# Patient Record
Sex: FEMALE | Race: WHITE | URBAN - METROPOLITAN AREA
[De-identification: names, ages, dates, MRNs, and addresses within clinical notes are randomized per-mention and may not be internally consistent; named-entity substitution may affect disease eponyms.]

---

## 2017-09-16 ENCOUNTER — HOSPITAL ENCOUNTER (OUTPATIENT)
Facility: CLINIC | Age: 62
Setting detail: OBSERVATION
Discharge: HOME | End: 2017-09-17
Attending: EMERGENCY MEDICINE | Admitting: INTERNAL MEDICINE
Payer: MEDICARE

## 2017-09-16 ENCOUNTER — APPOINTMENT (OUTPATIENT)
Dept: PHYSICAL THERAPY | Facility: CLINIC | Age: 62
End: 2017-09-16
Attending: INTERNAL MEDICINE
Payer: MEDICARE

## 2017-09-16 ENCOUNTER — APPOINTMENT (OUTPATIENT)
Dept: CT IMAGING | Facility: CLINIC | Age: 62
End: 2017-09-16
Attending: EMERGENCY MEDICINE
Payer: MEDICARE

## 2017-09-16 DIAGNOSIS — R56.9 SEIZURES (H): ICD-10-CM

## 2017-09-16 PROBLEM — R68.89 SPELLS OF DECREASED ATTENTIVENESS: Status: ACTIVE | Noted: 2017-09-16

## 2017-09-16 LAB
ALBUMIN SERPL-MCNC: 3.6 G/DL (ref 3.4–5)
ALP SERPL-CCNC: 66 U/L (ref 40–150)
ALT SERPL W P-5'-P-CCNC: 35 U/L (ref 0–50)
ANION GAP SERPL CALCULATED.3IONS-SCNC: 9 MMOL/L (ref 3–14)
AST SERPL W P-5'-P-CCNC: 37 U/L (ref 0–45)
BILIRUB SERPL-MCNC: 0.3 MG/DL (ref 0.2–1.3)
BUN SERPL-MCNC: 16 MG/DL (ref 7–30)
CALCIUM SERPL-MCNC: 9.2 MG/DL (ref 8.5–10.1)
CHLORIDE SERPL-SCNC: 108 MMOL/L (ref 94–109)
CO2 SERPL-SCNC: 26 MMOL/L (ref 20–32)
CREAT SERPL-MCNC: 0.96 MG/DL (ref 0.52–1.04)
ERYTHROCYTE [DISTWIDTH] IN BLOOD BY AUTOMATED COUNT: 12.8 % (ref 10–15)
ETHANOL SERPL-MCNC: 0.13 G/DL
GFR SERPL CREATININE-BSD FRML MDRD: 59 ML/MIN/1.7M2
GLUCOSE SERPL-MCNC: 86 MG/DL (ref 70–99)
HCT VFR BLD AUTO: 38 % (ref 35–47)
HGB BLD-MCNC: 12.8 G/DL (ref 11.7–15.7)
INTERPRETATION ECG - MUSE: NORMAL
MCH RBC QN AUTO: 30.1 PG (ref 26.5–33)
MCHC RBC AUTO-ENTMCNC: 33.7 G/DL (ref 31.5–36.5)
MCV RBC AUTO: 89 FL (ref 78–100)
PLATELET # BLD AUTO: 225 10E9/L (ref 150–450)
POTASSIUM SERPL-SCNC: 3.8 MMOL/L (ref 3.4–5.3)
PROT SERPL-MCNC: 7.5 G/DL (ref 6.8–8.8)
RBC # BLD AUTO: 4.25 10E12/L (ref 3.8–5.2)
SODIUM SERPL-SCNC: 143 MMOL/L (ref 133–144)
WBC # BLD AUTO: 9.2 10E9/L (ref 4–11)

## 2017-09-16 PROCEDURE — 93005 ELECTROCARDIOGRAM TRACING: CPT

## 2017-09-16 PROCEDURE — 97530 THERAPEUTIC ACTIVITIES: CPT | Mod: GP

## 2017-09-16 PROCEDURE — 99220 ZZC INITIAL OBSERVATION CARE,LEVL III: CPT | Performed by: INTERNAL MEDICINE

## 2017-09-16 PROCEDURE — 40000061 ZZH STATISTIC EEG TIME EA 10 MIN

## 2017-09-16 PROCEDURE — A9270 NON-COVERED ITEM OR SERVICE: HCPCS | Mod: GY | Performed by: PHYSICIAN ASSISTANT

## 2017-09-16 PROCEDURE — 80053 COMPREHEN METABOLIC PANEL: CPT | Performed by: EMERGENCY MEDICINE

## 2017-09-16 PROCEDURE — 85027 COMPLETE CBC AUTOMATED: CPT | Performed by: EMERGENCY MEDICINE

## 2017-09-16 PROCEDURE — 99207 ZZC CDG-CODE CATEGORY CHANGED: CPT | Performed by: INTERNAL MEDICINE

## 2017-09-16 PROCEDURE — A9270 NON-COVERED ITEM OR SERVICE: HCPCS | Mod: GY | Performed by: INTERNAL MEDICINE

## 2017-09-16 PROCEDURE — 96375 TX/PRO/DX INJ NEW DRUG ADDON: CPT

## 2017-09-16 PROCEDURE — 96361 HYDRATE IV INFUSION ADD-ON: CPT

## 2017-09-16 PROCEDURE — 25000132 ZZH RX MED GY IP 250 OP 250 PS 637: Mod: GY | Performed by: INTERNAL MEDICINE

## 2017-09-16 PROCEDURE — 95816 EEG AWAKE AND DROWSY: CPT

## 2017-09-16 PROCEDURE — 25000132 ZZH RX MED GY IP 250 OP 250 PS 637: Mod: GY | Performed by: PHYSICIAN ASSISTANT

## 2017-09-16 PROCEDURE — 99291 CRITICAL CARE FIRST HOUR: CPT | Mod: 25

## 2017-09-16 PROCEDURE — 40000193 ZZH STATISTIC PT WARD VISIT

## 2017-09-16 PROCEDURE — 96372 THER/PROPH/DIAG INJ SC/IM: CPT

## 2017-09-16 PROCEDURE — G0378 HOSPITAL OBSERVATION PER HR: HCPCS

## 2017-09-16 PROCEDURE — 25000128 H RX IP 250 OP 636: Performed by: INTERNAL MEDICINE

## 2017-09-16 PROCEDURE — 25000128 H RX IP 250 OP 636

## 2017-09-16 PROCEDURE — 97161 PT EVAL LOW COMPLEX 20 MIN: CPT | Mod: GP

## 2017-09-16 PROCEDURE — 97116 GAIT TRAINING THERAPY: CPT | Mod: GP,59

## 2017-09-16 PROCEDURE — 80320 DRUG SCREEN QUANTALCOHOLS: CPT | Performed by: EMERGENCY MEDICINE

## 2017-09-16 PROCEDURE — 96365 THER/PROPH/DIAG IV INF INIT: CPT

## 2017-09-16 PROCEDURE — 70450 CT HEAD/BRAIN W/O DYE: CPT

## 2017-09-16 PROCEDURE — 25000128 H RX IP 250 OP 636: Performed by: EMERGENCY MEDICINE

## 2017-09-16 PROCEDURE — 99292 CRITICAL CARE ADDL 30 MIN: CPT

## 2017-09-16 RX ORDER — BISACODYL 10 MG
10 SUPPOSITORY, RECTAL RECTAL DAILY PRN
Status: DISCONTINUED | OUTPATIENT
Start: 2017-09-16 | End: 2017-09-17 | Stop reason: HOSPADM

## 2017-09-16 RX ORDER — ONDANSETRON 2 MG/ML
INJECTION INTRAMUSCULAR; INTRAVENOUS
Status: COMPLETED
Start: 2017-09-16 | End: 2017-09-16

## 2017-09-16 RX ORDER — FLUTICASONE PROPIONATE 50 MCG
1 SPRAY, SUSPENSION (ML) NASAL DAILY PRN
COMMUNITY

## 2017-09-16 RX ORDER — LORAZEPAM 2 MG/ML
2 INJECTION INTRAMUSCULAR
Status: DISCONTINUED | OUTPATIENT
Start: 2017-09-16 | End: 2017-09-17 | Stop reason: HOSPADM

## 2017-09-16 RX ORDER — CELECOXIB 100 MG/1
100 CAPSULE ORAL 2 TIMES DAILY
Status: DISCONTINUED | OUTPATIENT
Start: 2017-09-16 | End: 2017-09-17 | Stop reason: HOSPADM

## 2017-09-16 RX ORDER — ONDANSETRON 2 MG/ML
4 INJECTION INTRAMUSCULAR; INTRAVENOUS EVERY 6 HOURS PRN
Status: DISCONTINUED | OUTPATIENT
Start: 2017-09-16 | End: 2017-09-17 | Stop reason: HOSPADM

## 2017-09-16 RX ORDER — FUROSEMIDE 40 MG
40 TABLET ORAL DAILY
Status: DISCONTINUED | OUTPATIENT
Start: 2017-09-16 | End: 2017-09-17 | Stop reason: HOSPADM

## 2017-09-16 RX ORDER — ONDANSETRON 4 MG/1
4 TABLET, ORALLY DISINTEGRATING ORAL EVERY 6 HOURS PRN
Status: DISCONTINUED | OUTPATIENT
Start: 2017-09-16 | End: 2017-09-17 | Stop reason: HOSPADM

## 2017-09-16 RX ORDER — POLYETHYLENE GLYCOL 3350 17 G/17G
17 POWDER, FOR SOLUTION ORAL DAILY PRN
Status: DISCONTINUED | OUTPATIENT
Start: 2017-09-16 | End: 2017-09-17 | Stop reason: HOSPADM

## 2017-09-16 RX ORDER — LORAZEPAM 2 MG/ML
1 INJECTION INTRAMUSCULAR ONCE
Status: DISCONTINUED | OUTPATIENT
Start: 2017-09-16 | End: 2017-09-17 | Stop reason: HOSPADM

## 2017-09-16 RX ORDER — CLONAZEPAM 1 MG/1
1 TABLET ORAL AT BEDTIME
Status: DISCONTINUED | OUTPATIENT
Start: 2017-09-16 | End: 2017-09-17 | Stop reason: HOSPADM

## 2017-09-16 RX ORDER — GABAPENTIN 600 MG/1
600 TABLET ORAL 3 TIMES DAILY
Status: DISCONTINUED | OUTPATIENT
Start: 2017-09-16 | End: 2017-09-17 | Stop reason: HOSPADM

## 2017-09-16 RX ORDER — POTASSIUM CHLORIDE 1500 MG/1
20 TABLET, EXTENDED RELEASE ORAL DAILY
COMMUNITY

## 2017-09-16 RX ORDER — METOPROLOL TARTRATE 25 MG/1
25 TABLET, FILM COATED ORAL 2 TIMES DAILY
Status: DISCONTINUED | OUTPATIENT
Start: 2017-09-16 | End: 2017-09-17 | Stop reason: HOSPADM

## 2017-09-16 RX ORDER — LABETALOL HYDROCHLORIDE 5 MG/ML
10 INJECTION, SOLUTION INTRAVENOUS
Status: DISCONTINUED | OUTPATIENT
Start: 2017-09-16 | End: 2017-09-17 | Stop reason: HOSPADM

## 2017-09-16 RX ORDER — PROCHLORPERAZINE MALEATE 5 MG
5-10 TABLET ORAL EVERY 6 HOURS PRN
Status: DISCONTINUED | OUTPATIENT
Start: 2017-09-16 | End: 2017-09-17 | Stop reason: HOSPADM

## 2017-09-16 RX ORDER — PROCHLORPERAZINE 25 MG
25 SUPPOSITORY, RECTAL RECTAL EVERY 12 HOURS PRN
Status: DISCONTINUED | OUTPATIENT
Start: 2017-09-16 | End: 2017-09-17 | Stop reason: HOSPADM

## 2017-09-16 RX ORDER — LEVOTHYROXINE SODIUM 112 UG/1
112 TABLET ORAL DAILY
COMMUNITY

## 2017-09-16 RX ORDER — CLONAZEPAM 1 MG/1
1 TABLET ORAL AT BEDTIME
Status: DISCONTINUED | OUTPATIENT
Start: 2017-09-16 | End: 2017-09-16

## 2017-09-16 RX ORDER — LEVOTHYROXINE SODIUM 112 UG/1
112 TABLET ORAL DAILY
Status: DISCONTINUED | OUTPATIENT
Start: 2017-09-16 | End: 2017-09-17 | Stop reason: HOSPADM

## 2017-09-16 RX ORDER — LORAZEPAM 2 MG/ML
INJECTION INTRAMUSCULAR
Status: DISCONTINUED
Start: 2017-09-16 | End: 2017-09-16 | Stop reason: HOSPADM

## 2017-09-16 RX ORDER — SODIUM CHLORIDE AND POTASSIUM CHLORIDE 150; 450 MG/100ML; MG/100ML
INJECTION, SOLUTION INTRAVENOUS CONTINUOUS
Status: DISCONTINUED | OUTPATIENT
Start: 2017-09-16 | End: 2017-09-16

## 2017-09-16 RX ORDER — HYDROCORTISONE 5 MG/1
5 TABLET ORAL 2 TIMES DAILY
Status: DISCONTINUED | OUTPATIENT
Start: 2017-09-16 | End: 2017-09-17 | Stop reason: HOSPADM

## 2017-09-16 RX ORDER — LEFLUNOMIDE 20 MG/1
20 TABLET ORAL DAILY
Status: DISCONTINUED | OUTPATIENT
Start: 2017-09-16 | End: 2017-09-17 | Stop reason: HOSPADM

## 2017-09-16 RX ORDER — ACETAMINOPHEN 325 MG/1
650 TABLET ORAL EVERY 4 HOURS PRN
Status: DISCONTINUED | OUTPATIENT
Start: 2017-09-16 | End: 2017-09-17 | Stop reason: HOSPADM

## 2017-09-16 RX ORDER — SODIUM CHLORIDE 9 MG/ML
INJECTION, SOLUTION INTRAVENOUS ONCE
Status: COMPLETED | OUTPATIENT
Start: 2017-09-16 | End: 2017-09-16

## 2017-09-16 RX ORDER — NALOXONE HYDROCHLORIDE 0.4 MG/ML
.1-.4 INJECTION, SOLUTION INTRAMUSCULAR; INTRAVENOUS; SUBCUTANEOUS
Status: DISCONTINUED | OUTPATIENT
Start: 2017-09-16 | End: 2017-09-17 | Stop reason: HOSPADM

## 2017-09-16 RX ORDER — AMOXICILLIN 250 MG
1-2 CAPSULE ORAL 2 TIMES DAILY PRN
Status: DISCONTINUED | OUTPATIENT
Start: 2017-09-16 | End: 2017-09-17 | Stop reason: HOSPADM

## 2017-09-16 RX ADMIN — POTASSIUM CHLORIDE AND SODIUM CHLORIDE: 450; 150 INJECTION, SOLUTION INTRAVENOUS at 05:55

## 2017-09-16 RX ADMIN — LEVETIRACETAM 1500 MG: 100 INJECTION, SOLUTION INTRAVENOUS at 00:58

## 2017-09-16 RX ADMIN — LABETALOL HYDROCHLORIDE 10 MG: 5 INJECTION, SOLUTION INTRAVENOUS at 22:58

## 2017-09-16 RX ADMIN — ACETAMINOPHEN 650 MG: 325 TABLET, FILM COATED ORAL at 05:59

## 2017-09-16 RX ADMIN — SODIUM CHLORIDE: 9 INJECTION, SOLUTION INTRAVENOUS at 02:09

## 2017-09-16 RX ADMIN — SODIUM CHLORIDE 1000 ML: 9 INJECTION, SOLUTION INTRAVENOUS at 00:58

## 2017-09-16 RX ADMIN — GABAPENTIN 600 MG: 600 TABLET, FILM COATED ORAL at 16:00

## 2017-09-16 RX ADMIN — CELECOXIB 100 MG: 100 CAPSULE ORAL at 21:30

## 2017-09-16 RX ADMIN — CLONAZEPAM 1 MG: 1 TABLET ORAL at 21:30

## 2017-09-16 RX ADMIN — METOPROLOL TARTRATE 25 MG: 25 TABLET ORAL at 19:29

## 2017-09-16 RX ADMIN — FUROSEMIDE 40 MG: 40 TABLET ORAL at 19:28

## 2017-09-16 RX ADMIN — GABAPENTIN 600 MG: 600 TABLET, FILM COATED ORAL at 21:29

## 2017-09-16 RX ADMIN — LEFLUNOMIDE 20 MG: 20 TABLET, FILM COATED ORAL at 17:41

## 2017-09-16 RX ADMIN — SODIUM CHLORIDE 500 ML: 9 INJECTION, SOLUTION INTRAVENOUS at 02:09

## 2017-09-16 RX ADMIN — HYDROCORTISONE 15 MG: 10 TABLET ORAL at 19:28

## 2017-09-16 RX ADMIN — GABAPENTIN 600 MG: 600 TABLET, FILM COATED ORAL at 11:15

## 2017-09-16 RX ADMIN — ONDANSETRON 4 MG: 2 SOLUTION INTRAMUSCULAR; INTRAVENOUS at 01:36

## 2017-09-16 RX ADMIN — HYDROCORTISONE 5 MG: 5 TABLET ORAL at 21:29

## 2017-09-16 RX ADMIN — HYDROCORTISONE 5 MG: 5 TABLET ORAL at 18:18

## 2017-09-16 RX ADMIN — METFORMIN HYDROCHLORIDE 500 MG: 500 TABLET, FILM COATED ORAL at 18:18

## 2017-09-16 RX ADMIN — ACETAMINOPHEN 650 MG: 325 TABLET, FILM COATED ORAL at 19:29

## 2017-09-16 ASSESSMENT — ENCOUNTER SYMPTOMS: SEIZURES: 1

## 2017-09-16 NOTE — PROGRESS NOTES
Care Transition Initial Assessment - RN    Reason For Consult: discharge planning, nonalliance concerns, utilization management concerns (OBS Broch)   Met with: Patient.    DATA   Active Problems:    Spells of decreased attentiveness       Cognitive Status: awake, oriented.  Primary Care Clinic Name: [facility]  Primary Care MD Name: Charis Armendariz  Contact information and PCP information verified: Yes, pt had no PCP listed.  Confirmed PCP and entered into EPIC    Lives With: significant other  Living Arrangements: house     Description of Support System: Supportive, Involved   Who is your support system?: Significant Other   Support Assessment: Adequate family and caregiver support, Adequate social supports     Insurance concerns: Other Pt is obs care.    ASSESSMENT  Patient currently receives the following services:  none        Identified issues/concerns regarding health management: Pt is having difficulty using her tablet, signing in, etc. She is in the hospital for sz vs nonepileptic event.  She is in town for a conference on a health disorder that she has.  [facility] won't be [facility] home until tomorrow.    PLAN  Financial costs for the patient include obs care copays .  Patient given options and choices for discharge: Neurologist prefers monitor overnight d/t lg amounts medications and not at baseline.  Patient/family is agreeable to the plan?  Yes  Patient anticipates discharging to hot tomorrow and fly home in afternoon .  Anticipate she will be able to fly by herself tomorrow.        Patient anticipates needs for home equipment: No      Care  (CTS) will continue to follow as needed.

## 2017-09-16 NOTE — PROVIDER NOTIFICATION
Paged regarding medication request. Patient takes hydrocortisone 15mg qam, 5mg noon, and 5mg in evening. She is very concerned that she missed her am dose and is requesting that she receive this. Discussed that BP is ok but will give 15mg tonight and get her back on home regimen in the am. Also requesting metoprolol and lasix, both have been ordered. BP this evening is 143/74.     Marie Carreon PA-C

## 2017-09-16 NOTE — ED NOTES
Bed: [facility]  Expected date: 9/16/17  Expected time: 12:20 AM  Means of arrival: Ambulance  Comments:  Mouna 536 62F status seizures

## 2017-09-16 NOTE — ED NOTES
MD notified of BPs, ordered 500 ml NS bolus. Patient continues to sleep soundly without evidence of seizures.

## 2017-09-16 NOTE — IP AVS SNAPSHOT
[address] [facility]    [address]    [place] [address]    Phone:  [phone]                                       After Visit Summary   9/16/2017    Stefany Laguerre    MRN: [id number]           After Visit Summary Signature Page     I have received my discharge instructions, and my questions have been answered. I have discussed any challenges I see with this plan with the nurse or doctor.    ..........................................................................................................................................  Patient/Patient Representative Signature      ..........................................................................................................................................  Patient Representative Print Name and Relationship to Patient    ..................................................               ................................................  Date                                            Time    ..........................................................................................................................................  Reviewed by Signature/Title    ...................................................              ..............................................  Date                                                            Time

## 2017-09-16 NOTE — ED PROVIDER NOTES
History     Chief Complaint:  Seizures    HPI   Stefany Laguerre is a 61 year old female with hx of meningioma s/p resection, seizures, hereditary angioedema who presents via EMS with seizures. The patient is visiting here from [place], and was observed to have about 15 seizure-like episodes in her hotel room this evening before EMS was called. She was given Gabapentin and 1 mg Ativan in her hotel room before EMS arrived by bystanders. EMS reports they witnessed 10 of these seizure like activities. They gave her 10 mg IM versed, and she had about 5 minutes of lucidity after this, before having another episode. She has had seizures roughly every 2 minutes. They report that in these episodes, she stiffens for about 30 seconds and then confused afterwards. She has been confused, but able to speak to EMS. Patient has had alcohol tonight. She has used Keppra before for seizures but not currently taking this. No fever or recent illness. No head trauma. EMS reports that patient was given icatibant for hereditary angioedema by bystanders though no report of swelling or symptoms consistent with this. She is in town for hereditary angioedema conference.    Allergies:  Penicillins     Medications:    Myeloic  Lorazepam  Gabapentin  Firazyr     Past Medical History:    Brain tumor     Past Surgical History:    History reviewed.  No significant past surgical history.    Family History:    History reviewed.  No significant family history.    Social History:  The patient presents via EMS.  The patient is from [place].     Review of Systems   Neurological: Positive for seizures.   All other systems reviewed and are negative.      Physical Exam   First Vitals:  BP: (!) 116/93  Pulse: 88  Resp: 20  Weight: 88 kg (194 lb 0.1 oz)  SpO2: 94 %    Physical Exam   General: Mildly postictal, but able to speak with me.  Eyes:  The pupils are equal and round    Conjunctivae and sclerae are normal  ENT:    No head trauma    The oropharynx  is clear  Neck:  Normal range of motion    No neck stiffness  CV:  Regular rate and rhythm    Skin warm and well perfused   Resp:  Lungs are clear    Non-labored    No rales    No wheezing   GI:  Abdomen is soft, there is no rigidity    No distension    No rebound tenderness    No abdominal tenderness   MS:  Normal muscular tone  Skin:  No rash or acute skin lesions noted  Neuro:   Awake but appears post-ictal    Speech is normal and fluent.    Face is symmetric.     Moves all extremities equally  Psych: Normal affect.  Appropriate interactions.      Emergency Department Course   ECG (0:28:33):  Indication: Seizures.   Rate 88 bpm. ND interval 170. QRS duration 76. QT/QTc 360/435. P-R-T axes -4.   Interpretation: Normal sinus rhythm. Septal infarct, age undetermined.  Agree with computer interpretation.   Interpreted at 0041 by Dr. Jenkins.     Imaging:  Radiographic findings were communicated with the patient who voiced understanding of the findings.    Head CT, without contrast, per radiology:   No acute abnormality.     Laboratory:  CBC: WNL (WBC 9.2, HGB 12.8, )  CMP: GFR 59 (L), o/w WNL (Creatinine 0.96)  0051: Blood alcohol: 0.13 (H)     Interventions:  0058: Normal Saline, 1000 mL, IV  0058: Keppra, 1500 mg, IV  0136: Zofran, 2 mg/mL, injection  0209: Normal Saline, 500 mL, IV    Emergency Department Course:  Nursing notes and vitals reviewed.  I performed an exam of the patient as documented above.  The above workup was undertaken.  0018: Patient arrived in the ED.   The patient was noted to have 2 more seizures while at CT.  0140: I rechecked the patient and discussed results. She has not had anymore seizures since receiving Keppra.  0227: I rechecked the patient.  0341: I discussed the patient with Dr. Dixon of the hospitalist service.     Findings and plan explained to the Patient who consents to admission. Discussed the patient with Dr. Dixon, who will admit the patient to a Neuro bed for further  monitoring, evaluation, and treatment.      Impression & Plan      Medical Decision Making:  Stefany Laguerre is a 61 year old female who presents via EMS with seizures. She has a history of seizures. It sounds like she had quite a few seizures prior to arrival in the ED. On arrival, she is mildly postictal, but able to talk to me. Non-focal exam. Did say initially she was on Keppra, but has not taken it in a while, so she was loaded with Keppra. CT head unremarkable. She is mildly intoxicated, at 0.13. Did have 2 tonic seizures in CT, witnessed by the nurse, that were described as a stiffening that lasted for 30 seconds, and she was postictal after these events. After the Keppra was infused, there were no more seizure activity. Will admit her given the frequent seizures. Patient was discussed with hospitalist, who agreed to accept the patient. Low suspicion for meningitis given no fever or recent illness.    Diagnosis:    ICD-10-CM    1. Seizures (H) R56.9      Disposition:  Admit to a Neuro bed under the care of Dr. Dixon.    I, Gerry Brownlee, am serving as a scribe on 9/16/2017 at 12:19 AM to personally document services performed by Dr. Jenkins, based on my observations and the provider's statements to me.     EMERGENCY DEPARTMENT       Alice, Chrissy Neal MD  09/16/17 0814

## 2017-09-16 NOTE — PROGRESS NOTES
"Patient admitted earlier this morning per Dr. Dixon. H&P reviewed. Here visiting from [place] for a conference and presented with concern for possible seizure. Has hx of epilepsy. Was drinking at the time spell occurred.    Head CT neg. Seen by neurology. EEG reviewed and overall unchanged from prior studies in [place]. No indication to start new AED. Conts on gabapentin. Patient endorsed feeling \"loopy\" and \"unsteady\" on her feet this afternoon -- possibly dt meds given in ED. Neurology recommended to watch overnight and if balance issues persist in AM consider evaluation with MRI brain.     Patient seen this afternoon -- still feels a little out of it but otherwise neurologically intact. Requested multiple home meds be resumed (has many with her since she was here for a conference). Orders placed. Has hx of angioedema and uses Icatibant as needed when she feels an attack coming on.     Can dc IVFs.    Anticipate discharge home tomorrow morning if she remains stable.     Linette Ann, DO  Internal Medicine - Hospitalist  9/16/2017  4:37 PM      "

## 2017-09-16 NOTE — ED NOTES
.  [facility]  ED Nurse Handoff Report    ED Chief complaint: Seizures (last seizure 6 months ago. every 2 minutes with EMS- stiffening seizures, last about 30 seconds. Postictal)      ED Diagnosis:   Final diagnoses:   None       Code Status: Full Code    Allergies:   Allergies   Allergen Reactions     Penicillins        Activity level - Baseline/Home:  Independent    Activity Level - Current:   Stand with Assist     Needed?: No    Isolation: No  Infection: Not Applicable    Bariatric?: No    Vital Signs:   Vitals:    09/16/17 0211 09/16/17 0221 09/16/17 0229 09/16/17 0230   BP: 93/47 112/63 (!) 125/98    Pulse:       Resp: 13 20  12   Temp:       TempSrc:       SpO2: 97% 98% 97% 98%   Weight:           Cardiac Rhythm: ,   Cardiac  Cardiac Rhythm: Normal sinus rhythm    Pain level:      Is this patient confused?: Yes - postictal, patient needs reorienting. Hasn't had witness seizure since 0120    Patient Report: Initial Complaint: Seizures  Focused Assessment: Patient in town from [place] for a medical convention. Hx of brain tumor with surgery to remove this past December ([facility]). Does not take any routine keppra and denies seizures for >4 months. Tonight was out for drinks (first time in 5 years), also reports headache all day. Seizure began around midnight at hotel- friends there gave her her home meds (oral ativan and gabapentin). Seizures continued every 5-10 minutes in ED, consisting of stiffening and staring off or closing eyes for 30 seconds, then waking up confused and ready to go home. Easily reoriented and cooperative.   Tests Performed: Head CT, blood  Abnormal Results: NA  Treatments provided: IV keppra, 1500 ml NS bolus. (IM versad with EMS)    Family Comments: No family present    OBS brochure/video discussed/provided to patient: N/A    ED Medications:   Medications   LORazepam (ATIVAN) injection 1 mg (not administered)   0.9% sodium chloride BOLUS (500 mLs  Intravenous New Bag 9/16/17 0209)   levETIRAcetam (KEPPRA) 1,500 mg in NaCl 0.9 % 100 mL intermittent infusion (0 mg Intravenous Stopped 9/16/17 0118)   0.9% sodium chloride BOLUS (0 mLs Intravenous Stopped 9/16/17 0206)   ondansetron (ZOFRAN) 2 MG/ML injection (4 mg  Given 9/16/17 0136)   0.9% sodium chloride infusion ( Intravenous New Bag 9/16/17 0209)       Drips infusing?:  Yes      ED NURSE PHONE NUMBER: *[id number]

## 2017-09-16 NOTE — PROGRESS NOTES
Routine awake and drowsy EEG, ordered by Dr. Fany Dixon.  # .  Dr. Page from neurology was consulted.  Pt is groggy but awakens and is able to follow simple commands and understands what is going on in enviornment.  Falls back to sleep easily.

## 2017-09-16 NOTE — ED NOTES
Back from CT. Had 2 seizures while at CT, stiffening, eye stare or roll back, lasting about 30 seconds. Wakes up and wonders where she is and if she can go open. Easily reoriented.     MD notified and at bedside.

## 2017-09-16 NOTE — ED NOTES
Pt. Given pamphlet regarding observation admission status.  She does not stay awake to watch video.

## 2017-09-16 NOTE — PLAN OF CARE
Problem: Goal Outcome Summary  Goal: Goal Outcome Summary  Outcome: No Change  Pt arrived from ED at 0530. Pt Somnolent, arouses to voice. Neuro: baseline L droop, LUE slightly weaker than RUE, and neuropathy: numbness and tingling in bilateral feet. VSS. Regular diet. A2, unsteady on feet. C/o headache, managed with ice pack and tylenol. Voiding adequately. Plan for EEG today, will continue to monitor.

## 2017-09-16 NOTE — H&P
History and Physical     Stefany Laguerre MRN# [id number]   YOB: 1955 Age: 61 year old      Date of Admission:  9/16/2017    Primary care provider: No primary care provider on file.          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 She has a history of angioedema and is here visiting from [place] for a Angiodema Conference. She was drinking the evening of presentation with BAL of  0.13.     Recurrent spells - With h/o focal onset seizures related to a prior right frontotemporal meningioma that was resected in 2012, as well as non-epileptiform seizures. She follows closely with a neurologist and is managed on Neurontin. Recent MRI at the beginning of this month was unremarkable with no evidence of residual or recurrent disease.   - She was ativan 1 mg, versed 10 mg and Keppra 1500 mg in the ED   - No further spells seen and currently lethargic due to post-ictal state vs medications   - EEG will be ordered in the AM (note prior EEGs are documented at her last neurology appt in August in Care Everywhere).   - I am not going to continue her Keppra at this point and will defer to neurologist. None of the episodes were witnessed by an MD and we should try to verify we are dealing with actual epileptic seizures before committing her to another AED.   - We'll continue her PTA klonipin and gabapentin  - Seizure precautions with ativan PRN    Awaiting medication reconciliation   - PTA medications below can be ordered once complete.     Adrenal Insufficiency - of unclear etiology from review of records.   - Continue PTA hydrocortsone    Angioedema   - Takes Icatibant SC PRN, and apparently took a dose tonight, per EMS report but no symptoms to suggest angioedema attack    RA  - Continue PTA  "tofacitinib, mobic, leflunomide, voltaren    Hypothyroidism  - Continue PTA levoxyl    HTN  - Continue PTA metoprolol, lasix, potassium    GERD  - Continue PTA PPI.     Dispo  - admitted under observation to monitor for recurrence and complete EEG and neurology consultation.                     Chief Complaint:   Spells.          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 History was obtained from the ED physician and review of the chart as patient will only wake up momentarily and will not stay awake to provide any history.     Per ED report: she was attending an angiodema conference as she has hereditary angioedema, and had been drinking at that conference. She returned to the hotel with other attendees of the conference and was  observed to have about 15 episodes of whole body stiffening in her hotel room this evening before EMS was called. She was given Gabapentin and 1 mg Ativan in her hotel room before EMS arrived. EMS reports they witnessed 10 more of these spells. They gave her 10 mg versed, and she had about 5 minutes of lucidity after this, before having another episode. She has had seizures roughly every 2 minutes. She has been confused, but able to speak to EMS.     In the ED she was given Keppra,. 1.5 L NS bolus and zofran and has been sleeping comfortably and will awaken momentarily and follows commands appropriately, but will not stay awake to answer questions.     From visit in with Neurologist on 8/2017 Claus Doyle MD, PhD in Care Everywhere:     \"Focal onset seizure, likely related to prior right temporal meningioma.  Most Recent episode seemed triggered by participated in virtual reality.  Also has a diagnosis of nonepileptic spells. " "    Plan:  Localization-related epilepsy:  - for now continue Gabapentin (Neurontin) 600 / 600 / 300 mg  - Discussed potential relevant medication side-effects. She will call us if she experiences any of these side-effects.   - Ambulatory EEG, 72 hours, rule out subclinical seizures for brain fog.   - MRI brain, 3T, epilepsy protocol, to also evaluate vessels and meningioma for headaches.\"    > MRI on 9/8 showed s/p right frontotemporal craniotomy and resection of meningioma without evidence of residual or recurrent meningioma within the limitations of a non contrast enhanced examination\" and was otherwise normal.                Past Medical History:     Anemia     H/O angioedema   Acquired angioedema     Headache     HTN (hypertension)     Hypothyroidism 2000   Hashimoto's     Macular retinal edema     Meningioma (CMS-HCC)  right temporal meningioma resection in 10/2012     Migraines - triggered by lights sounds, but remains conscious    MRSA (methicillin resistant Staphylococcus aureus)     Pancreatitis     Rheumatoid arthritis (CMS-HCC)     Seizure disorder (CMS-HCC)   - H/o hypokalemia  - Reports history of Adrenal Failure (\"pituitary gland failed\")  - GERD, says Cardiac workup ok, barium swallow           Past Surgical History:     CHOLECYSTECTOMY     COLONOSCOPY WITH OR WITHOUT BRUSHING N/A 1/31/2017   Performed by Nathanael King MD at [facility] ENDOSCOPY     CRNEC EXC CULLEN ITTL/PFOS MENINGIOMA   meningioma resection     HX APPENDECTOMY     HX LAPAROSCOPY   to evaluate for endometriosis at age 16     HX LUMBAR FUSION   lower ones, abdominal approach     HX TONSIL AND ADENOIDECTOMY     HX TOTAL ABD HYSTERECTOMY (OVARIES INTACT/NO CERVIX)     TOTAL ABDOMINAL HYSTERECTOMY   1 ovary still in            Social History:     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   Lives with: Fiance.  Driving Status: Not driving.             Family History:        Stroke Father     Diabetes " Father     CAD, Late Onset Father   history of bypass     Valvular Heart Disease Mother     Arthritis, Rheumatoid Paternal Grandmother     Healthy Sister     Healthy Brother     Cancer, Other Maternal Grandmother   liver cancer     CAD, Late Onset Maternal Grandfather     Arthritis, Rheumatoid Paternal Aunt     Sons with spina bifida occulta         Immunizations:     There is no immunization history on file for this patient.         Allergies:     Allergies   Allergen Reactions     Penicillins              Medications:     PER CARE EVERYWHERE    clonazePAM (KLONOPIN) 1 mg tablet take 1 Tab by mouth every bedtime 30 Tab 5     diclofenac sodium (VOLTAREN) 1 % topical gel Apply 4 gram BID to both knees 100 g 3     estradiol (ESTRACE) 1 mg tablet     fluticasone (FLONASE) 50 mcg/actuation SpSn spray 1 Spray by Nasal route 2 times a day 16 g 6     furosemide (LASIX) 40 mg tablet take 1 Tab by mouth daily 90 Tab 1     gabapentin (NEURONTIN) 300 mg capsule Take 600 mg in the morning, 300 mg mid-day, 600 mg at night 450 Cap 1     hydrocortisone (CORTEF) 5 mg tablet Take 3 tabs in the AM, 1 tab at noon, 0.5 tab at night (total 4.5 daily) 450 Tab 3     ICATIBANT ACETATE (ICATIBANT SC) inject subcutaneous (under the skin)     leflunomide (ARAVA) 20 mg tablet take 1 Tab by mouth daily 90 Tab 0     LEVOXYL 112 mcg TABS take 1 Tab by mouth every day 90 Tab 3     LORazepam (ATIVAN) 1 mg tablet take 1 Tab by mouth every 4 hours as needed (for seizure) 10 Tab 0     meloxicam (MOBIC) 7.5 mg tablet TAKE 2 TABLETS BY MOUTH DAILY 180 Tab 1     metoprolol (LOPRESSOR) 25 mg tablet take 1 Tab by mouth 2 times a day 180 Tab 12     other drug 30 Each by Injection route once as needed Icatibant (FIRAZYR)     pantoprazole (PROTONIX) 40 mg delayed release tablet take 1 Tab by mouth daily 30 Tab 3     potassium chloride (K-DUR, KLOR-CON) 20 mEq sustained release tablet take 20 mEq by mouth 2 times a day     SUMAtriptan (IMITREX) 100 mg tablet  Take 1 tab PO at onset of headache, may repeat after 2 hrs if needed. Do not exceed 2 doses in 24 hours or 10 days per month 20 Tab 2     tofacitinib 5 mg TABS take 5 mg by mouth 2 times a day 60 Tab 3       Prescription Medications as of 9/16/2017             MELOXICAM PO Take 7.5 mg by mouth    LORAZEPAM PO Take 1 mg by mouth    GABAPENTIN PO Take 600 mg by mouth 3 times daily    Icatibant Acetate (FIRAZYR) 30 MG/3ML SOLN injection Inject 30 mg Subcutaneous once      Facility Administered Medications as of 9/16/2017             levETIRAcetam (KEPPRA) 1,500 mg in NaCl 0.9 % 100 mL intermittent infusion Inject 1,500 mg into the vein once    0.9% sodium chloride BOLUS Inject 1,000 mLs into the vein once    LORazepam (ATIVAN) injection 1 mg Inject 0.5 mLs (1 mg) into the vein once    ondansetron (ZOFRAN) 2 MG/ML injection     0.9% sodium chloride infusion Inject into the vein once    0.9% sodium chloride BOLUS Inject 500 mLs into the vein once                Review of Systems:   The 10 point Review of Systems is negative other than noted in the HPI              Physical Exam:   Blood pressure 124/78, pulse 88, temperature 97.6  F (36.4  C), temperature source Oral, resp. rate 12, weight 88 kg (194 lb 0.1 oz), SpO2 99 %.    Constitutional:   lethargic, awakens momentarily follows commands and then falls back asleep, cooperative, no apparent distress, and appears stated age     Eyes:   Lids and lashes normal, pupils equal, round and reactive to light, extra ocular muscles intact, sclera clear, conjunctiva normal     ENT:   Normocephalic, without obvious abnormality, atramatic, oral pharynx with moist mucus membranes, tonsils without erythema or exudates .     Neck:   Supple, symmetrical, trachea midline, no adenopathy, thyroid symmetric, not enlarged and no tenderness, skin normal     Lungs:   No increased work of breathing, good air exchange, clear to auscultation bilaterally, no crackles or wheezing      Cardiovascular:   Regular rate and rhythm, normal S1 and S2, no S3 or S4, and no murmur noted. Extremities are warm. No edema.      Abdomen:   Normal bowel sounds, soft, non-distended, non-tender, no masses palpated, no hepatosplenomegally     Musculoskeletal:   There is no redness, warmth, or swelling of the joints. Normal build.      Neurologic:   Awake, alert, oriented to name, place and time.  Cranial nerves II-XII are grossly intact.  Moving a 4 extremities without gross focal weakness.     Neuropsychiatric:   General: normal, calm and normal eye contact     Skin:   no bruising or bleeding, no redness, warmth, or swelling and no rashes            Data:     Results for orders placed or performed during the hospital encounter of 09/16/17   CT Head w/o Contrast    Narrative    CT HEAD W/O CONTRAST  9/16/2017 1:15 AM      HISTORY: Multiple seizures. History of brain tumor.    TECHNIQUE: Routine noncontrast head CT. Radiation dose for this scan  was reduced using automated exposure control, adjustment of the mA  and/or kV according to patient size, or iterative reconstruction  technique.    COMPARISON: None.    FINDINGS: Brain volume is normal for age. No mass, mass effect or  intracranial hemorrhage. No evidence of acute infarct. The visualized  paranasal sinuses are clear.  Old postoperative changes in the right  skull.      Impression    IMPRESSION: No acute abnormality.    KERI CANCHOLA MD   Comprehensive metabolic panel   Result Value Ref Range    Sodium 143 133 - 144 mmol/L    Potassium 3.8 3.4 - 5.3 mmol/L    Chloride 108 94 - 109 mmol/L    Carbon Dioxide 26 20 - 32 mmol/L    Anion Gap 9 3 - 14 mmol/L    Glucose 86 70 - 99 mg/dL    Urea Nitrogen 16 7 - 30 mg/dL    Creatinine 0.96 0.52 - 1.04 mg/dL    GFR Estimate 59 (L) >60 mL/min/1.7m2    GFR Estimate If Black 71 >60 mL/min/1.7m2    Calcium 9.2 8.5 - 10.1 mg/dL    Bilirubin Total 0.3 0.2 - 1.3 mg/dL    Albumin 3.6 3.4 - 5.0 g/dL    Protein Total 7.5  6.8 - 8.8 g/dL    Alkaline Phosphatase 66 40 - 150 U/L    ALT 35 0 - 50 U/L    AST 37 0 - 45 U/L   CBC (+ platelets, no diff)   Result Value Ref Range    WBC 9.2 4.0 - 11.0 10e9/L    RBC Count 4.25 3.8 - 5.2 10e12/L    Hemoglobin 12.8 11.7 - 15.7 g/dL    Hematocrit 38.0 35.0 - 47.0 %    MCV 89 78 - 100 fl    MCH 30.1 26.5 - 33.0 pg    MCHC 33.7 31.5 - 36.5 g/dL    RDW 12.8 10.0 - 15.0 %    Platelet Count 225 150 - 450 10e9/L   Alcohol level blood   Result Value Ref Range    Ethanol g/dL 0.13 (H) <0.01 g/dL

## 2017-09-16 NOTE — PROGRESS NOTES
09/16/17 1700   Quick Adds   Type of Visit Initial PT Evaluation   Living Environment   Lives With significant other   Living Arrangements house   Home Accessibility no concerns   Transportation Available car;family or friend will provide   Self-Care   Usual Activity Tolerance good   Current Activity Tolerance fair   Regular Exercise no   Equipment Currently Used at Home none   Functional Level Prior   Ambulation 0-->independent   Transferring 0-->independent   Fall history within last six months no   Which of the above functional risks had a recent onset or change? none   Prior Functional Level Comment Pt currently visiting from [place], here for a conference. Pt currently staying in a hotel. At baseline, patient reports that she lives with her SO in a home with all needs met on first floor.   General Information   Onset of Illness/Injury or Date of Surgery - Date 09/16/17   Referring Physician Dr. 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 She has a history of angioedema and is here visiting from [place] for a Angiodema Conference.   Precautions/Limitations fall precautions   Cognitive Status Examination   Orientation orientation to person, place and time   Level of Consciousness lethargic/somnolent   Pain Assessment   Patient Currently in Pain No   Range of Motion (ROM)   ROM Quick Adds No deficits were identified   Strength   Strength Comments Gross LE strength 4/5 bilaterally   Bed Mobility   Bed Mobility Comments SBA supine to sit   Transfer Skills   Transfer Comments Sit to/from stand CGA   Gait   Gait Comments 15' IV pole min assist   Balance   Balance Comments  "Good in sitting, fair in standing   General Therapy Interventions   Planned Therapy Interventions balance training;bed mobility training;gait training;strengthening;transfer training;risk factor education;home program guidelines   Clinical Impression   Criteria for Skilled Therapeutic Intervention yes, treatment indicated   PT Diagnosis Impaired ambulation   Influenced by the following impairments Decreased strength, decreased endurance, decreased balance   Functional limitations due to impairments Difficulty with bed mobility, transfers, ambulation   Clinical Presentation Stable/Uncomplicated   Clinical Presentation Rationale VSS, pain controlled   Clinical Decision Making (Complexity) Low complexity   Therapy Frequency` daily   Predicted Duration of Therapy Intervention (days/wks) 3 days   Anticipated Equipment Needs at Discharge walker   Anticipated Discharge Disposition Home with Assist   Risk & Benefits of therapy have been explained Yes   Patient, Family & other staff in agreement with plan of care Yes   [facility] [employer] \"6 Clicks\"   2016, Trustees of [facility], under license to [employer].  All rights reserved.   6 Clicks Short Forms Basic Mobility Inpatient Short Form   [facility]  \"6 Clicks\" V.2 Basic Mobility Inpatient Short Form   1. Turning from your back to your side while in a flat bed without using bedrails? 3 - A Little   2. Moving from lying on your back to sitting on the side of a flat bed without using bedrails? 3 - A Little   3. Moving to and from a bed to a chair (including a wheelchair)? 3 - A Little   4. Standing up from a chair using your arms (e.g., wheelchair, or bedside chair)? 3 - A Little   5. To walk in hospital room? 3 - A Little   6. Climbing 3-5 steps with a railing? 2 - A Lot   Basic Mobility Raw Score (Score out of 24.Lower scores equate to lower levels of function) 17   Total Evaluation Time   Total Evaluation Time (Minutes) 12     "

## 2017-09-16 NOTE — PROGRESS NOTES
"SPIRITUAL HEALTH SERVICES  Spiritual Assessment Progress Note  [facility]  Pt was appreciative of support since she is in [place] alone with no family.  Pt shared parts of her story and her health history including brain tumor.  Pt does not remember much of what happened to her but does remember drinking alcohol and that she feels that is what stimulated the seizures.  Pt was very distraught that she was missing the conference that she came for.  Pt named that she has not told her family (daughter and significant other) that she is in the hospital because she wants to retain her independence and she doesn't want them worrying about her and  preventing her from traveling.  She named multiple times that \"people were making too big a deal out of this!\"   Pt was unable to see any seriousness in this.   Nydia is important to pt and she named the story of \"Nathanael and Nadja\" as a metaphor for her hines with her health issues.   A woman that pt just met at the conference came up to the hospital and brought pt some of her things - glasses, cell phone etc.    provided support and encouragement.      continues to be available for support as needed.                                                                                                                                                    Concepción Simons M.Div., [facility]  Staff    Pager [phone]  "

## 2017-09-16 NOTE — ED NOTES
MD at bedside to discuss ct results and plan to admit. Patient sleeping but arrousable. BP improved when awake.

## 2017-09-16 NOTE — PLAN OF CARE
Problem: Seizure Disorder/Epilepsy (Adult)  Goal: Signs and Symptoms of Listed Potential Problems Will be Absent or Manageable (Seizure Disorder/Epilepsy)  Signs and symptoms of listed potential problems will be absent or manageable by discharge/transition of care (reference Seizure Disorder/Epilepsy (Adult) CPG).   Outcome: No Change  No seizures this shift, baseline left droop and hemiparesis noted. Pt quite lethargic this morning, PT to eval.

## 2017-09-16 NOTE — IP AVS SNAPSHOT
MRN:[id number]                      After Visit Summary   9/16/2017    Stefany Laguerre    MRN: [id number]           Thank you!     Thank you for choosing [facility] for your care. Our goal is always to provide you with excellent care. Hearing back from our patients is one way we can continue to improve our services. Please take a few minutes to complete the written survey that you may receive in the mail after you visit with us. Thank you!        Patient Information     Date Of Birth          1955        Designated Caregiver       Most Recent Value    Caregiver    Will someone help with your care after discharge? yes    Name of designated caregiver Michael Eisenberg    Phone number of caregiver [phone]    Caregiver address same      About your hospital stay     You were admitted on:  September 16, 2017 You last received care in the:  [address] [facility]    You were discharged on:  September 17, 2017        Reason for your hospital stay       Evaluation of your spell, which was concerning for possible seizures.                  Who to Call     For medical emergencies, please call 911.  For non-urgent questions about your medical care, please call your primary care provider or clinic, None          Attending Provider     Provider Specialty    Chrissy Jenkins MD Emergency Medicine    Fany Dixon MD Internal Medicine       Primary Care Provider    Provider Not In System      After Care Instructions     Activity       Your activity upon discharge: activity as tolerated            Diet       Follow this diet upon discharge: Regular                  Follow-up Appointments     Follow-up and recommended labs and tests        Follow up with your providers in [place], including your neurologist and PCP, in the next 7-10 days.                  Pending Results     No orders found for last 3 day(s).            Statement of Approval     Ordered          09/17/17 0820  I have  "reviewed and agree with all the recommendations and orders detailed in this document.  EFFECTIVE NOW     Approved and electronically signed by:  Linette Ann DO             Admission Information     Date & Time Provider Department Dept. Phone    2017 Fany Dixon MD [address] [facility] [phone]      Your Vitals Were     Blood Pressure Pulse Temperature Respirations Weight Pulse Oximetry    137/77 (BP Location: Left arm) 77 97.7  F (36.5  C) (Oral) 16 85.5 kg (188 lb 8 oz) 94%      MyChart Information     [employer] lets you send messages to your doctor, view your test results, renew your prescriptions, schedule appointments and more. To sign up, go to [email or web address] . Click on \"Log in\" on the left side of the screen, which will take you to the Welcome page. Then click on \"Sign up Now\" on the right side of the page.     You will be asked to enter the access code listed below, as well as some personal information. Please follow the directions to create your username and password.     Your access code is: [email or web address]  Expires: 2017  8:24 AM     Your access code will  in 90 days. If you need help or a new code, please call your [facility] clinic or [phone].        Care EveryWhere ID     This is your Care EveryWhere ID. This could be used by other organizations to access your [facility] medical records  [id number]        Equal Access to Services     [facility] : Hadii alejandra xiong hadasho Soomaali, waaxda luqadaha, qaybta kaalmada isabelegyada, simona bingham . So [facility] [phone].    ATENCIÓN: Si habla español, tiene a holman disposición servicios gratuitos de asistencia lingüística. Llame al [phone].    We comply with applicable federal civil rights laws and [place] laws. We do not discriminate on the basis of race, color, national origin, age, disability sex, sexual orientation or gender identity.               Review of your " medicines      CONTINUE these medicines which have NOT CHANGED        Dose / Directions    CLONAZEPAM PO        Dose:  1 mg   Take 1 mg by mouth At Bedtime   Refills:  0       * CORTEF PO        Dose:  15 mg   Take 15 mg by mouth every morning In addition to 5 mg at noon and at bedtime   Refills:  0       * CORTEF PO        Dose:  5 mg   Take 5 mg by mouth 2 times daily At noon and at bedtime. In addition to 15 mg morning dose   Refills:  0       FIRAZYR 30 MG/3ML Soln injection   Indication:  Hereditary Angioedema   Generic drug:  Icatibant Acetate   Notes to Patient:  Available anytime        Dose:  30 mg   Inject 30 mg Subcutaneous every 6 hours as needed (Max of 3 doses in 24 hours)   Refills:  0       fluticasone 50 MCG/ACT spray   Commonly known as:  FLONASE        Dose:  1 spray   Spray 1 spray into both nostrils daily as needed for rhinitis or allergies   Refills:  0       FUROSEMIDE PO        Dose:  40 mg   Take 40 mg by mouth daily   Refills:  0       GABAPENTIN PO   Indication:  600mg morning, 300 mid day, 600mg at night        Dose:  600 mg   Take 600 mg by mouth 3 times daily   Refills:  0       IMITREX PO   Notes to Patient:  Resume home med schedule        Dose:  100 mg   Take 100 mg by mouth once   Refills:  0       LEFLUNOMIDE PO        Dose:  20 mg   Take 20 mg by mouth daily   Refills:  0       LEVOXYL 112 MCG tablet   Generic drug:  levothyroxine        Dose:  112 mcg   Take 112 mcg by mouth daily BRAND NAME ONLY   Refills:  0       LORAZEPAM PO        Dose:  1 mg   Take 1 mg by mouth 2 times daily as needed Max daily dose: 2 mg   Refills:  0       MELOXICAM PO   Notes to Patient:  Resume home med schedule        Dose:  7.5 mg   Take 7.5 mg by mouth 2 times daily   Refills:  0       METFORMIN HCL PO        Dose:  500 mg   Take 500 mg by mouth 2 times daily (with meals)   Refills:  0       METOPROLOL TARTRATE PO        Dose:  25 mg   Take 25 mg by mouth 2 times daily   Refills:  0        PANTOPRAZOLE SODIUM PO   Notes to Patient:  Resume home med schedule        Dose:  40 mg   Take 40 mg by mouth every morning (before breakfast)   Refills:  0       potassium chloride 20 MEQ CR tablet   Generic drug:  potassium chloride SA   Notes to Patient:  Resume home med schedule        Dose:  20 mEq   Take 20 mEq by mouth daily   Refills:  0       XELJANZ 5 MG tablet   Generic drug:  tofacitinib        Dose:  5 mg   Take 5 mg by mouth 2 times daily   Refills:  0       * Notice:  This list has 2 medication(s) that are the same as other medications prescribed for you. Read the directions carefully, and ask your doctor or other care provider to review them with you.             Protect others around you: Learn how to safely use, store and throw away your medicines at www.disposemymeds.org.             Medication List: This is a list of all your medications and when to take them. Check marks below indicate your daily home schedule. Keep this list as a reference.      Medications           Morning Afternoon Evening Bedtime As Needed    CLONAZEPAM PO   Take 1 mg by mouth At Bedtime   Last time this was given:  1 mg on 9/16/2017  9:30 PM   Next Dose Due:  9/17/17 at bedtime                                   * CORTEF PO   Take 15 mg by mouth every morning In addition to 5 mg at noon and at bedtime   Last time this was given:  15 mg on 9/17/2017  8:37 AM   Next Dose Due:  9/18/17 at noon                                   * CORTEF PO   Take 5 mg by mouth 2 times daily At noon and at bedtime. In addition to 15 mg morning dose   Last time this was given:  15 mg on 9/17/2017  8:37 AM   Next Dose Due:  9/17/17 at noon                                      FIRAZYR 30 MG/3ML Soln injection   Inject 30 mg Subcutaneous every 6 hours as needed (Max of 3 doses in 24 hours)   Last time this was given:  30 mg on 9/16/2017  9:31 PM   Generic drug:  Icatibant Acetate   Notes to Patient:  Available anytime                                    fluticasone 50 MCG/ACT spray   Commonly known as:  FLONASE   Spray 1 spray into both nostrils daily as needed for rhinitis or allergies                                   FUROSEMIDE PO   Take 40 mg by mouth daily   Last time this was given:  40 mg on 9/17/2017  8:38 AM   Next Dose Due:  9/18/17                                   GABAPENTIN PO   Take 600 mg by mouth 3 times daily   Last time this was given:  600 mg on 9/17/2017  8:37 AM   Next Dose Due:  9/17/17 in the afternoon                                         IMITREX PO   Take 100 mg by mouth once   Notes to Patient:  Resume home med schedule                                LEFLUNOMIDE PO   Take 20 mg by mouth daily   Last time this was given:  20 mg on 9/17/2017  8:37 AM   Next Dose Due:  9/18/17                                   LEVOXYL 112 MCG tablet   Take 112 mcg by mouth daily BRAND NAME ONLY   Last time this was given:  112 mcg on 9/17/2017  8:36 AM   Generic drug:  levothyroxine   Next Dose Due:  9/18/17                                   LORAZEPAM PO   Take 1 mg by mouth 2 times daily as needed Max daily dose: 2 mg                                   MELOXICAM PO   Take 7.5 mg by mouth 2 times daily   Notes to Patient:  Resume home med schedule                                METFORMIN HCL PO   Take 500 mg by mouth 2 times daily (with meals)   Last time this was given:  500 mg on 9/17/2017  8:37 AM   Next Dose Due:  9/17/17 at dinnertime                                      METOPROLOL TARTRATE PO   Take 25 mg by mouth 2 times daily   Last time this was given:  25 mg on 9/17/2017  8:37 AM   Next Dose Due:  9/17/17 at usual time                                      PANTOPRAZOLE SODIUM PO   Take 40 mg by mouth every morning (before breakfast)   Notes to Patient:  Resume home med schedule                                potassium chloride 20 MEQ CR tablet   Take 20 mEq by mouth daily   Generic drug:  potassium chloride SA   Notes to Patient:  Resume home med  schedule                                XELJANZ 5 MG tablet   Take 5 mg by mouth 2 times daily   Last time this was given:  5 mg on 9/17/2017  8:36 AM   Generic drug:  tofacitinib   Next Dose Due:  9/17/17 at usual time                                   * Notice:  This list has 2 medication(s) that are the same as other medications prescribed for you. Read the directions carefully, and ask your doctor or other care provider to review them with you.              More Information        Alcohol Withdrawal Seizure  Some seizures are caused by alcohol withdrawal. Alcohol withdrawal usually begins after prolonged or heavy drinking for a number of days, and then you suddenly stop drinking, or cut down on your alcohol use. Seizures can also be directly caused by alcohol, even without withdrawal. Seizures may occur as soon as a few hours after your last drink or 1 to 2 days later.  If you have a history of seizures from any cause, you are more likely to have a seizure with alcohol abuse. Standard seizure medicines may not prevent alcohol withdrawal seizures.  Delirium tremens or DTs  When you have a seizure because of alcohol, you are more likely to develop DTs. DTs are the worst stage of the alcohol withdrawal syndrome. If it happens, it usually begins about 3 to 5 days after your last drink. It is potentially life-threatening.  Symptoms of DTs include:    Sudden and severe mental or nervous system changes    Uncontrollable tremors    Severe disorientation, confusion, hallucinations    Heart racing, or irregular heartbeat    High blood pressure    Seizures    Coma and death  Home care  The following can help you care for yourself at home:    You will need plenty of rest and fluids over the next several days. Eat regular meals. Do not drink any more alcohol. During this time, it is best that you stay with family or friends who can help and support you. You can also admit yourself to an outpatient, inpatient, or residential  detox program.    Don't drive until all symptoms are gone and you are feeling better. You should also not drive until you have been checked by your doctor. The doctor will need to rule out an actual seizure disorder.    If you were given sedative medicine to help your symptoms, don't take it more often than prescribed and never take it with alcohol.    Heavy regular drinking combined with poor nutrition can lead to a thiamine deficiency and cause permanent brain damage. It is important that you take daily vitamins.  Follow-up care  Once you have gone through the withdrawal symptoms, you have fought half of the hines. To avoid the risk of returning to your previous drinking pattern, you should get follow-up support and treatment. These resources can help you:    Alcoholics Anonymous offers support through a self-help fellowship www.aa.org    Al-Ano offers support to families of alcohol users 338-438-2077 www.al-anon.org    National [place] on Alcoholism and Drug Dependence 558-080-1678 www.ncadd.org    Search the Internet or check your phonebook for Drug Abuse & Treatment Centers.  Call 911  Call 911 if any of these occur:    Another seizure    Trouble breathing or slow, irregular breathing    Chest pain    Sudden weakness on one side of your body or sudden trouble speaking    Heavy bleeding or vomiting blood    Very drowsy or trouble awakening    Fainting or loss of consciousness    Rapid heart rate  When to seek medical advice  Call your healthcare provider right away if any of these occur:    Severe shakiness    Hallucinations    Fever of 100.4 F (38.0 C) or higher    Headache, confusion    Pain in your upper abdomen that gets worse    Repeated vomiting  Date Last Reviewed: 6/1/2016 2000-2017 [employer]. [address] [place] [address], [place] [address]. All rights reserved. This information is not intended as a substitute for professional medical care. Always follow your healthcare professional's  instructions.

## 2017-09-16 NOTE — ED NOTES
MD notified that seizures have mostly ceseased; patient sleeping and no notable jerking or stiffening. Vitals continue to remain normal.    Plan to hold ativan until more seizure noted.

## 2017-09-16 NOTE — CONSULTS
[facility]  [facility]    Neurology Consultation    Stefany Laguerre MRN# [id number]   YOB: 1955 Age: 61 year old    Code Status:Full Code   Date of Admission: 9/16/2017  Date of Consult:09/16/2017                                                                                       Assessment and Plan:                                         #. Spells of altered/loss of consciousness 9/15.  Etiology unclear-hx of epileptic seizure/nonepileptic episodes. ?reaction to icatibant(no AE of sz listed) Alcohol level was elevated on admission.    --EEG this am to be reviewed.  Received load of levetiracetam.  Continue gabapentin.  Agree that additional AED not clearly indicated.  Reevaluate after EEG.  Pt expresses desire to be discharged to return to Angioedema conference.  Will need to follow up with epilepsy specialist in [place].   #Lethargy-suspect hangover/residual of medications.  Postictal part of diff dx.    #Extensive medical comorbidity:  Hereditary angioedema, neuropathy, insomnia, migraine.  These conditions with need to be included in interpretation of reported sx of patient.      Later:  Reevaluated pt: more awake but still lethargic.     On sitting up, pt leaning to left side.  Unsteady on feet.--weakness is effort related, suggesting functional component.   EEG:  occ right temporal sharp waves vs breach artifact.  Similar to reports at [facility].  Advise pt continue to be observed.  [place] and has no one to be responsible for her.    If she is improved and able to walk safely later this afternoon, consider discharge.   If still with left side difficulty tomorrow, consider mri brain  Dr. Davey to cover tomorrow.      ----------------------------------------------------------------------------------  ----------------------------------------------------------------------------------  Reason for consult: I was asked by   "Zack to evaluate this patient for episode of seizure activity       Chief Complaint:   Unaware:    History is obtained from the patient / chart       History of Present Illness:   This patient is a 61 year old female who presents with episodes of AOC/possible seizure.  Per ED report:  The patient is visiting here from [place], and was observed to have about 15 seizure-like episodes in her hotel room this evening before EMS was called. She was given Gabapentin and 1 mg Ativan in her hotel room before EMS arrived by bystanders. EMS reports they witnessed 10 of these seizure like activities. They gave her 10 mg IM versed, and she had about 5 minutes of lucidity after this, before having another episode. She has had seizures roughly every 2 minutes. They report that in these episodes, she stiffens for about 30 seconds and then confused afterwards. She has been confused, but able to speak to EMS. Patient has had alcohol tonight. She has used Keppra before for seizures but not currently taking this. No fever or recent illness. No head trauma. EMS reports that patient was given icatibant for hereditary angioedema by bystanders though no report of swelling or symptoms consistent with this. She is in town for hereditary angioedema conference.  Overnight, no further seizure episodes, although pt very lethargic/sleepy.    No evidence of tongue trauma       Past Medical History:   No past medical history on file.   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      Past Surgical History:   No past surgical history on file.   CHOLECYSTECTOMY     COLONOSCOPY WITH OR WITHOUT BRUSHING N/A 1/31/2017   Performed by Nathanael King MD at [facility] ENDOSCOPY     CRNEC EXC CULLEN ITTL/PFOS MENINGIOMA   meningioma resection     HX APPENDECTOMY     HX LAPAROSCOPY   to evaluate for endometriosis at age 16     HX LUMBAR FUSION   lower ones, abdominal approach     HX TONSIL AND ADENOIDECTOMY     HX TOTAL ABD HYSTERECTOMY (OVARIES INTACT/NO CERVIX)     TOTAL ABDOMINAL HYSTERECTOMY   1 ovary still in        Social History:     Social History     Social History     Marital status: N/A     Spouse name: N/A     Number of children: N/A     Years of education: N/A     Social History Main Topics     Smoking status: Not on file     Smokeless tobacco: Not on file     Alcohol use Not on file     Drug use: Not on file     Sexual activity: Not on file     Other Topics Concern     Not on file     Social History Narrative     Report hx of no etoh intake in 5 years before last PM.  Not reliable for other info      Family History:   No family history on file.  ?hereditary angioedema.  Not able to obtain reliable hx related to lethargy at this time.       Home Medications:     Prior to Admission Medications   Prescriptions Last Dose Informant Patient Reported? Taking?   GABAPENTIN PO 9/16/2017 at 0000  Yes Yes   Sig: Take 600 mg by mouth 3 times daily   Icatibant Acetate (FIRAZYR) 30 MG/3ML SOLN injection   Yes Yes   Sig: Inject 30 mg Subcutaneous once   LORAZEPAM PO 9/16/2017 at 0000  Yes Yes   Sig: Take 1 mg by mouth   MELOXICAM PO 9/16/2017 at Unknown time  Yes Yes   Sig: Take 7.5 mg by mouth      Facility-Administered Medications: None          Allergy:     Allergies   Allergen Reactions     Penicillins           Inpatient Medications:   Scheduled Meds:    LORazepam  1 mg Intravenous Once      "gabapentin (NEURONTIN) tablet 600 mg  600 mg Oral TID     clonazePAM  1 mg Oral At Bedtime     PRN Meds: acetaminophen, naloxone, melatonin, senna-docusate, polyethylene glycol, bisacodyl, ondansetron **OR** ondansetron, prochlorperazine **OR** prochlorperazine **OR** prochlorperazine, LORazepam        Review of Systems    Review of systems is not obtainable due to patient factors - confusion  A comprehensive review of  10 systems was performed and found to be negative except as described in this note  CONSTITUTIONAL: negative for fever, chills, change in weight  INTEGUMENTARY/SKIN: no rash or obvious new lesions  ENT/MOUTH: no sore throat, new sinus pain or nasal drainage, no neck mass noted  RESP: No pleuretic pain, No cough, no hemoptysis, No SOB   CV: negative for chest pain, palpitations or peripheral edema  GI: no nausea, vomiting, change in stools  : no dysuria or hematuria  MUSCULOSKELETAL: no myalgias, arthralgias or join efffusion  ENDOCRINE: no history of polyuria, polydyspsia or symptoms of thyroid dysfunction  PSYCHIATRIC: no change in mood stable  LYMPHATIC: no new lymphadenopathy  HEME: no bleeding or easy bruisability   NEURO: see HPI       Physical Exam:   Physical Exam   Vitals:  Height:Data Unavailable  Weight:188 lbs 8 oz   Temp: 98.1  F (36.7  C) Temp src: Oral BP: 130/67 Pulse: 88 Heart Rate: 93 Resp: 16 SpO2: 93 % O2 Device: None (Room air) Oxygen Delivery: 2 LPM  General Appearance:  No acute distress, overweight  Neuro:       Mental Status Exam:    Lethargic.  Stated age as \"39\" but later corrected to correct age.  Difficulty with state but related she was here for angioedema conference.  Lang/speech slow but intact       Cranial Nerves:   2-12 intact. Perrl.  Fundus:  TD           Motor:   Tone bulk intact x4.  Some difficulty with effort/strength left leg/pt reports this is chronic.            Reflexes:  Symmetric.  Plantar nl bilaterally       Sensory:  Vibration and cold intact x4      "             Coordination:   Not able to fully cooperate Grossly intact x4       Gait:  unable   Neck: no nuchal rigidity, normal thyroid. No carotid bruits.    Cardiovascular: Regular rate and rhythm, no m/r/g      Extremities: No clubbing, no cyanosis, no edema       Data:   ROUTINE IP LABS   CBC RESULTS:     Recent Labs  Lab 09/16/17  0051   WBC 9.2   RBC 4.25   HGB 12.8   HCT 38.0        Basic Metabolic Panel:   Recent Labs   Lab Test  09/16/17 0051   NA  143   POTASSIUM  3.8   CHLORIDE  108   CO2  26   BUN  16   CR  0.96   GLC  86   MALINA  9.2     Liver panel:  Recent Labs   Lab Test  09/16/17 0051   PROTTOTAL  7.5   ALBUMIN  3.6   BILITOTAL  0.3   ALKPHOS  66   AST  37   ALT  35          IMAGING:   All imaging studies were reviewed personally  CT head:   No acute change/post craniotomy change-chronic  MRI brain:   Reviewed outside report from [place] 9/8/17.  No significant abnormality/known residual of right craniotomy.

## 2017-09-16 NOTE — PHARMACY-ADMISSION MEDICATION HISTORY
Admission medication history interview status for the 9/16/2017  admission is complete. See EPIC admission navigator for prior to admission medications     Medication history source reliability:Good    Actions taken by pharmacist (provider contacted, etc):Discussed PTA med list with patient      Additional medication history information not noted on PTA med list :None    Medication reconciliation/reorder completed by provider prior to medication history? No    Time spent in this activity: 30 minutes     Prior to Admission medications    Medication Sig Last Dose Taking? Auth Provider   MELOXICAM PO Take 7.5 mg by mouth 2 times daily  9/16/2017 at Unknown time Yes Reported, Patient   LORAZEPAM PO Take 1 mg by mouth 2 times daily as needed Max daily dose: 2 mg 9/16/2017 at 0000 Yes Reported, Patient   GABAPENTIN PO Take 600 mg by mouth 3 times daily 9/16/2017 at 0000 Yes Reported, Patient   Icatibant Acetate (FIRAZYR) 30 MG/3ML SOLN injection Inject 30 mg Subcutaneous once  9/15/2017 at Unknown time Yes Reported, Patient   potassium chloride SA (POTASSIUM CHLORIDE) 20 MEQ CR tablet Take 20 mEq by mouth daily 9/15/2017 at Unknown time Yes Unknown, Entered By History   fluticasone (FLONASE) 50 MCG/ACT spray Spray 1 spray into both nostrils daily as needed for rhinitis or allergies  at prn Yes Unknown, Entered By History   SUMAtriptan Succinate (IMITREX PO) Take 100 mg by mouth once Past Week at Unknown time Yes Unknown, Entered By History   LEFLUNOMIDE PO Take 20 mg by mouth daily 9/15/2017 at Unknown time Yes Unknown, Entered By History   METOPROLOL TARTRATE PO Take 25 mg by mouth 2 times daily 9/15/2017 at Unknown time Yes Unknown, Entered By History   CLONAZEPAM PO Take 1 mg by mouth At Bedtime 9/14/2017 at Unknown time Yes Unknown, Entered By History   Hydrocortisone (CORTEF PO) Take 15 mg by mouth every morning In addition to 5 mg at noon and at bedtime 9/15/2017 at Unknown time Yes Unknown, Entered By History    Hydrocortisone (CORTEF PO) Take 5 mg by mouth 2 times daily At noon and at bedtime. In addition to 15 mg morning dose 9/15/2017 at Unknown time Yes Unknown, Entered By History   levothyroxine (LEVOXYL) 112 MCG tablet Take 112 mcg by mouth daily BRAND NAME ONLY 9/15/2017 at Unknown time Yes Unknown, Entered By History   tofacitinib (XELJANZ) 5 MG tablet Take 5 mg by mouth 2 times daily 9/15/2017 at Unknown time Yes Unknown, Entered By History   FUROSEMIDE PO Take 40 mg by mouth daily 9/15/2017 at Unknown time Yes Unknown, Entered By History   PANTOPRAZOLE SODIUM PO Take 40 mg by mouth every morning (before breakfast) 9/15/2017 at Unknown time Yes Unknown, Entered By History

## 2017-09-16 NOTE — PROVIDER NOTIFICATION
Patient is requesting to get more 20mg of cortisone after taking 5mg saying that it is not enough, that she missed her morning dose already. Patient also requesting to get her morning metoprolol 25mg and lasix 40mg now which she did not get. Call placed for Dr Marie Carreon, waiting for call back.

## 2017-09-16 NOTE — PROVIDER NOTIFICATION
Dr Carreon called back and ordered cortisone 15mg and Lasix 40mg once. Dr Carreon also said to give bedtime metoprolol early.

## 2017-09-17 ENCOUNTER — APPOINTMENT (OUTPATIENT)
Dept: PHYSICAL THERAPY | Facility: CLINIC | Age: 62
End: 2017-09-17
Payer: MEDICARE

## 2017-09-17 VITALS
OXYGEN SATURATION: 94 % | TEMPERATURE: 97.7 F | RESPIRATION RATE: 16 BRPM | SYSTOLIC BLOOD PRESSURE: 137 MMHG | WEIGHT: 188.5 LBS | DIASTOLIC BLOOD PRESSURE: 77 MMHG | HEART RATE: 77 BPM

## 2017-09-17 PROCEDURE — 97116 GAIT TRAINING THERAPY: CPT | Mod: GP | Performed by: PHYSICAL THERAPIST

## 2017-09-17 PROCEDURE — 25000132 ZZH RX MED GY IP 250 OP 250 PS 637: Mod: GY | Performed by: PHYSICIAN ASSISTANT

## 2017-09-17 PROCEDURE — G0378 HOSPITAL OBSERVATION PER HR: HCPCS

## 2017-09-17 PROCEDURE — A9270 NON-COVERED ITEM OR SERVICE: HCPCS | Mod: GY | Performed by: INTERNAL MEDICINE

## 2017-09-17 PROCEDURE — 25000132 ZZH RX MED GY IP 250 OP 250 PS 637: Mod: GY | Performed by: INTERNAL MEDICINE

## 2017-09-17 PROCEDURE — A9270 NON-COVERED ITEM OR SERVICE: HCPCS | Mod: GY | Performed by: PHYSICIAN ASSISTANT

## 2017-09-17 PROCEDURE — 40000193 ZZH STATISTIC PT WARD VISIT: Performed by: PHYSICAL THERAPIST

## 2017-09-17 PROCEDURE — 99217 ZZC OBSERVATION CARE DISCHARGE: CPT | Performed by: INTERNAL MEDICINE

## 2017-09-17 RX ADMIN — LEFLUNOMIDE 20 MG: 20 TABLET, FILM COATED ORAL at 08:37

## 2017-09-17 RX ADMIN — HYDROCORTISONE 15 MG: 10 TABLET ORAL at 08:37

## 2017-09-17 RX ADMIN — LEVOTHYROXINE SODIUM 112 MCG: 112 TABLET ORAL at 08:36

## 2017-09-17 RX ADMIN — FUROSEMIDE 40 MG: 40 TABLET ORAL at 08:38

## 2017-09-17 RX ADMIN — METOPROLOL TARTRATE 25 MG: 25 TABLET ORAL at 08:37

## 2017-09-17 RX ADMIN — CELECOXIB 100 MG: 100 CAPSULE ORAL at 08:38

## 2017-09-17 RX ADMIN — METFORMIN HYDROCHLORIDE 500 MG: 500 TABLET, FILM COATED ORAL at 08:37

## 2017-09-17 RX ADMIN — GABAPENTIN 600 MG: 600 TABLET, FILM COATED ORAL at 08:37

## 2017-09-17 ASSESSMENT — VISUAL ACUITY: OU: NORMAL ACUITY;BASELINE

## 2017-09-17 NOTE — PROCEDURES
ELECTROENCEPHALOGRAM      ORDERING PHYSICIAN:  Fany Dixon MD       EEG #        HISTORY:  Diagnosed elsewhere with both nonepileptic and epileptic seizures.  Admitted with recurrent episodes last evening.  The patient, Edie Molina improved.  She was lethargic but otherwise cooperative during the EEG.  The EEG background is of low amplitude.  Significant amounts of beta activity were present over bilateral head regions.  Activity over the right temporal region was of higher amplitude, likely representing breach artifact.  Occasional independent sharp waves were noted in the right temporal lobe.  No electrographic seizure activity occurred during the recording.  The patient was asleep for a majority of the recording.  Sleep activity mainly consisted of low amplitude polymorphic delta frequencies.  Occasional vertex activity was observed.  An EKG monitor at the time of the recording revealed sinus rhythm.      Photic stimulation and hyperventilation could not be performed.      IMPRESSION:  This EEG does reveal occasional independent sharp waves in the left temporal region consistent with the patient's known history of surgery in that location.  This may represent breach artifact related to craniotomy.  No electrographic seizure activity occurred during this recording.         ROMAN RICH MD             D: 2017 12:45   T: 2017 08:16   MT: JORDYN      Name:     EDIE MOLINA   MRN:      [id number]        Account:        [id number]   :      1955           Procedure Date: 2017      Document: [id number]

## 2017-09-17 NOTE — PROVIDER NOTIFICATION
Patient blood pressure 202/100. No change in status. Called Dr Garza and updated and he said he will order labetalol.

## 2017-09-17 NOTE — PLAN OF CARE
Problem: Goal Outcome Summary  Goal: Goal Outcome Summary  Outcome: Completed Date Met:  09/17/17  A&O, forgetful about events yesterday. Neuros @ baseline w/ L facial droop, LUE/LLE hemiparesis, numbness and tingling in all extremities. VSS. szr precautions, no szr activity noted. regular diet. Up with SBA. Denies pain. Plan to d/c back to hotel and fly back to [place] today. Will f/u with neurologist and PCP in [place]. No changes to medications, all questions answered.

## 2017-09-17 NOTE — PLAN OF CARE
Problem: Goal Outcome Summary  Goal: Goal Outcome Summary  Outcome: Improving  A&O, forgetful. VSS on RA. Complained of aching joints. Generalized weakness. Baseline bilateral extremity numbness and tingling present. Slight left facial droop. Left extremity drift and strength 4/5. Denied HA. Nausea reported but resolved with rest. Trace edema to hands and feet. Other neuros intact. No seizure activity witnessed or reported. Seizure precautions maintained. Up with 1A. Will continue to monitor.

## 2017-09-17 NOTE — PLAN OF CARE
Problem: Goal Outcome Summary  Goal: Goal Outcome Summary  PT - Pt is feeling much better today, BP within normal range, has been discharged by MD and has 3pm flight to [place] to catch. Pt demonstrates mod ind gait with railing for support 100', ind with transfers. Pt uses a cane at home, it is in her hotel room. She will have assistance at the airport to get to her gate, etc. No further PT needed, pt has met all PT goals.     Discharge Planner PT   Patient plan for discharge: home today  Current status: Mod ind with all mobility  Barriers to return to prior living situation: None noted  Recommendations for discharge: Home with assist from family  Rationale for recommendations: Pt has good family support from fiancee and daughter and pt is moving well.       Entered by: Ivon Beth 09/17/2017 9:03 AM        Physical Therapy Discharge Summary     Reason for therapy discharge:    Discharged to home.     Progress towards therapy goal(s). See goals on Care Plan in [facility] electronic health record for goal details.  Goals met     Therapy recommendation(s):    No further therapy is recommended.

## 2017-09-17 NOTE — DISCHARGE SUMMARY
"[facility]    Discharge Summary  Hospitalist    Date of Admission:  9/16/2017  Date of Discharge:  9/17/2017  Discharging Provider: Linette Ann    Discharge Diagnoses   Spells, with altered LOC in the context of underlying seizure disorder    History of Present Illness   Stefany Laguerre is an 61 year old female with complicated PMHx including hx of resection for right temporal meningioma s/p resection in 2012, seizure disorder, acquired angioedema, adrenal insufficiency on chronic steroids (patient reported as \"pituitary gland failure\", hypertension, hypothyroidism, rheumatoid arthritis, insomnia and hx of migraines who presented after a \"spell\" concerning for a seizure.     She was attending an angiodema conference and had been drinking at that conference. She returned to the hotel with other attendees of the conference and was observed to have about 15 episodes of whole body stiffening in her hotel room. EMS was called. She was given Gabapentin and 1 mg Ativan in her hotel room before EMS arrived. EMS reports they witnessed 10 more of these \"spells\". They gave her 10 mg versed, and she had about 5 minutes of lucidity after this, before having another episode. She has had seizures roughly every 2 minutes. She was confused but able to speak to EMS.      In the ED, her VS were stable. Labs okay. EtOH 0.13. Head CT was neg. She was given Keppra, 1.5 L NS bolus and zofran and has been sleeping comfortably and will awaken momentarily and follows commands appropriately, but will not stay awake to answer questions.      From visit in with Neurologist on 8/2017 Claus Doyle MD, PhD:   \"Focal onset seizure, likely related to prior right temporal meningioma. Most Recent episode seemed triggered by participated in virtual reality. Also has a diagnosis of nonepileptic spells.\"     Hospital Course   Stefany Laguerre was admitted on 9/16/2017.  The following problems were addressed during her " "hospitalization:    She was admitted to the hospital under observation. She was seen by neurology. An EEG was done and showed occasional R temporal sharp waves vs breach artifact and appeared similar to prior studies done in [place]. She was continued on gabapentin. No changes were advised to her medications, including her AEDs.    She felt somewhat \"loopy\" and \"out of it\" on the day of admission, which was attributed to the medications she recieved prior to arrival at the hospital and in the ED. Mentation steadily improved during her stay and by the morning following admission she had returned to her baseline. It was felt that she was medically stable to discharge and travel back to [place]. She was advised to follow up with her neurologist there in the next 7-10 days. She tells me she has an EEG scheduled for the beginning of October.    All other medical problems remained stable this stay on home medications.     Linette Ann    Significant Results and Procedures   EEG as above showed occasional right temporal sharp waves vs breach artifact.    Pending Results   None    Code Status   Full Code       Primary Care Physician   Provider Not In System    Physical Exam   Temp: 97.7  F (36.5  C) Temp src: Oral BP: 137/77 Pulse: 77 Heart Rate: 74 Resp: 16 SpO2: 94 % O2 Device: None (Room air)    Vitals:    09/16/17 0024 09/16/17 0530   Weight: 88 kg (194 lb 0.1 oz) 85.5 kg (188 lb 8 oz)     Vital Signs with Ranges  Temp:  [97.4  F (36.3  C)-98.2  F (36.8  C)] 97.7  F (36.5  C)  Pulse:  [77-81] 77  Heart Rate:  [64-98] 74  Resp:  [16] 16  BP: (129-202)/() 137/77  SpO2:  [91 %-98 %] 94 %  I/O last 3 completed shifts:  In: 1908.33 [P.O.:1100; I.V.:808.33]  Out: -     General: Resting comfortably, alert, conversive, NAD  CVS: HRRR, no MGR, no LE edema  Respiratory: CTAB, no wheeze/rales/rhonchi  GI: S, NT, ND, +BS  Skin: Warm/dry  Neuro: CNs 2-12 intact, no focal deficits in " strength/sensation    Discharge Disposition   Discharged to home  Condition at discharge: Stable    Consultations This Hospital Stay   NEUROLOGY IP CONSULT    Time Spent on this Encounter   I, Linette Ann, personally saw the patient today and spent greater than 30 minutes discharging this patient.    Discharge Orders     Reason for your hospital stay   Evaluation of your spell, which was concerning for possible seizures.     Follow-up and recommended labs and tests    Follow up with your providers in [place], including your neurologist and PCP, in the next 7-10 days.     Activity   Your activity upon discharge: activity as tolerated     Diet   Follow this diet upon discharge: Regular       Discharge Medications   Current Discharge Medication List      CONTINUE these medications which have NOT CHANGED    Details   MELOXICAM PO Take 7.5 mg by mouth 2 times daily       LORAZEPAM PO Take 1 mg by mouth 2 times daily as needed Max daily dose: 2 mg      GABAPENTIN PO Take 600 mg by mouth 3 times daily      Icatibant Acetate (FIRAZYR) 30 MG/3ML SOLN injection Inject 30 mg Subcutaneous every 6 hours as needed (Max of 3 doses in 24 hours)       potassium chloride SA (POTASSIUM CHLORIDE) 20 MEQ CR tablet Take 20 mEq by mouth daily      fluticasone (FLONASE) 50 MCG/ACT spray Spray 1 spray into both nostrils daily as needed for rhinitis or allergies      SUMAtriptan Succinate (IMITREX PO) Take 100 mg by mouth once      LEFLUNOMIDE PO Take 20 mg by mouth daily      METOPROLOL TARTRATE PO Take 25 mg by mouth 2 times daily      CLONAZEPAM PO Take 1 mg by mouth At Bedtime      !! Hydrocortisone (CORTEF PO) Take 15 mg by mouth every morning In addition to 5 mg at noon and at bedtime      !! Hydrocortisone (CORTEF PO) Take 5 mg by mouth 2 times daily At noon and at bedtime. In addition to 15 mg morning dose      levothyroxine (LEVOXYL) 112 MCG tablet Take 112 mcg by mouth daily BRAND NAME ONLY      tofacitinib (XELJANZ)  5 MG tablet Take 5 mg by mouth 2 times daily      FUROSEMIDE PO Take 40 mg by mouth daily      PANTOPRAZOLE SODIUM PO Take 40 mg by mouth every morning (before breakfast)      METFORMIN HCL PO Take 500 mg by mouth 2 times daily (with meals)       !! - Potential duplicate medications found. Please discuss with provider.        Allergies   Allergies   Allergen Reactions     Penicillins      Data   Most Recent 3 CBC's:  Recent Labs   Lab Test  09/16/17 0051   WBC  9.2   HGB  12.8   MCV  89   PLT  225      Most Recent 3 BMP's:  Recent Labs   Lab Test  09/16/17 0051   NA  143   POTASSIUM  3.8   CHLORIDE  108   CO2  26   BUN  16   CR  0.96   ANIONGAP  9   MALINA  9.2   GLC  86     Most Recent 2 LFT's:  Recent Labs   Lab Test  09/16/17 0051   AST  37   ALT  35   ALKPHOS  66   BILITOTAL  0.3       Results for orders placed or performed during the hospital encounter of 09/16/17   CT Head w/o Contrast    Narrative    CT HEAD W/O CONTRAST  9/16/2017 1:15 AM      HISTORY: Multiple seizures. History of brain tumor.    TECHNIQUE: Routine noncontrast head CT. Radiation dose for this scan  was reduced using automated exposure control, adjustment of the mA  and/or kV according to patient size, or iterative reconstruction  technique.    COMPARISON: None.    FINDINGS: Brain volume is normal for age. No mass, mass effect or  intracranial hemorrhage. No evidence of acute infarct. The visualized  paranasal sinuses are clear.  Old postoperative changes in the right  skull.      Impression    IMPRESSION: No acute abnormality.    KERI CANCHOLA MD

## 2017-09-17 NOTE — PROVIDER NOTIFICATION
Brief update:    Pt w/ Hypertension; >200 systolic currently. Unclear if contribution from change in home med dosing, increased steroid dose tonight, anxiety, or combination of factors.    IV labetalol added PRN for Systolic >180    Clayton Garza MD  10:45 PM

## 2017-09-17 NOTE — PLAN OF CARE
Problem: Goal Outcome Summary  Goal: Goal Outcome Summary  Outcome: Improving  A&O x 4. Forgetful. Drowsiness improving. Generalized weakness. +1 edema bilateral hands, feet/ankle. Baseline numbness/tingling bilateral fingers and toes due to neuropathy. Baseline LUE/LLE weakness with drift, strength of  3/5. Slight left facial droop. Headache decreased with tylenol. Neuros intact. High BP of 202/100. Prn Labetalol given at  2258. Regular diet. Up with 1 with belt to use the bathroom. Seizure precaution, no reported or witnessed seizure activity. Plan continue to monitor.

## 2018-01-21 ENCOUNTER — HEALTH MAINTENANCE LETTER (OUTPATIENT)
Age: 63
End: 2018-01-21

## 2020-03-11 ENCOUNTER — HEALTH MAINTENANCE LETTER (OUTPATIENT)
Age: 65
End: 2020-03-11

## 2020-12-27 ENCOUNTER — HEALTH MAINTENANCE LETTER (OUTPATIENT)
Age: 65
End: 2020-12-27

## 2021-04-25 ENCOUNTER — HEALTH MAINTENANCE LETTER (OUTPATIENT)
Age: 66
End: 2021-04-25

## 2021-10-09 ENCOUNTER — HEALTH MAINTENANCE LETTER (OUTPATIENT)
Age: 66
End: 2021-10-09

## 2022-03-26 ENCOUNTER — HEALTH MAINTENANCE LETTER (OUTPATIENT)
Age: 67
End: 2022-03-26

## 2022-05-21 ENCOUNTER — HEALTH MAINTENANCE LETTER (OUTPATIENT)
Age: 67
End: 2022-05-21

## 2022-09-17 ENCOUNTER — HEALTH MAINTENANCE LETTER (OUTPATIENT)
Age: 67
End: 2022-09-17

## 2023-06-04 ENCOUNTER — HEALTH MAINTENANCE LETTER (OUTPATIENT)
Age: 68
End: 2023-06-04